# Patient Record
Sex: FEMALE | Race: BLACK OR AFRICAN AMERICAN | URBAN - METROPOLITAN AREA
[De-identification: names, ages, dates, MRNs, and addresses within clinical notes are randomized per-mention and may not be internally consistent; named-entity substitution may affect disease eponyms.]

---

## 2023-04-12 PROBLEM — N93.9 ABNORMAL UTERINE BLEEDING (AUB): Status: ACTIVE | Noted: 2023-04-12

## 2023-04-12 PROBLEM — D63.8 ANEMIA, CHRONIC DISEASE: Status: ACTIVE | Noted: 2023-04-12

## 2023-06-02 ENCOUNTER — TELEPHONE (OUTPATIENT)
Age: 43
End: 2023-06-02

## 2023-06-02 NOTE — TELEPHONE ENCOUNTER
Called and left a message for the patient. Patient missed her ultrasound and visit at the beginning of May. Just trying to see if that patient would like to get her visit rescheduled.

## 2024-11-08 ENCOUNTER — TELEPHONE (OUTPATIENT)
Age: 44
End: 2024-11-08

## 2024-11-08 NOTE — TELEPHONE ENCOUNTER
I TRIED CALLING THE PATIENT BACK AT BOTH [facility] MOBILE AND HOME AND UNABLE TO LEAVE A MESSAGE. NO ANSWER

## 2024-11-08 NOTE — TELEPHONE ENCOUNTER
Dilcia transferred this patient to my line. She was very difficult to talk with and deal with.  The could not make up her mind as to why she wanted to be seen. First she stated that she needed to be seen to schedule surgery and then she stated she needed to be seen for a regular doctor to see if she needed to have surgery and then she stated that she has a hernia that she needs to see if she needs surgery. I told her Dilcia had scheduled her for a consultation to discuss surgery with Dr. Renteria and she then raised her voice and stated that she did not need a consultation for surgery I then raised my voice and she stated that she was going to report me for raising my voice and I told her that I raised my voice because she raised her at me and I was trying to get her to listen to me. She then said that she had never been diagnosis ed with fibroids as Dilcia had stated and I advised her that Dr Clancy has it listed in her chart. She said well just because it is listed does not mean that she has them and that she has not had an ultrasound. I advised the patient that we needed to put down a reason for her visit and she then stated that she was going to pray for me and I told her that I would pray for her to and she hung up on me. Patient has an appointment with Dr. Renteria.